# Patient Record
Sex: FEMALE | Race: WHITE
[De-identification: names, ages, dates, MRNs, and addresses within clinical notes are randomized per-mention and may not be internally consistent; named-entity substitution may affect disease eponyms.]

---

## 2018-04-05 ENCOUNTER — HOSPITAL ENCOUNTER (EMERGENCY)
Dept: HOSPITAL 17 - NEPE | Age: 68
LOS: 1 days | Discharge: HOME | End: 2018-04-06
Payer: OTHER GOVERNMENT

## 2018-04-05 VITALS — HEIGHT: 67 IN | WEIGHT: 198.42 LBS | BODY MASS INDEX: 31.14 KG/M2

## 2018-04-05 VITALS
SYSTOLIC BLOOD PRESSURE: 130 MMHG | RESPIRATION RATE: 16 BRPM | DIASTOLIC BLOOD PRESSURE: 75 MMHG | TEMPERATURE: 98.6 F | HEART RATE: 75 BPM | OXYGEN SATURATION: 100 %

## 2018-04-05 DIAGNOSIS — M19.90: ICD-10-CM

## 2018-04-05 DIAGNOSIS — S01.81XA: Primary | ICD-10-CM
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
## 2018-04-05 DIAGNOSIS — E07.9: ICD-10-CM

## 2018-04-05 DIAGNOSIS — I10: ICD-10-CM

## 2018-04-05 DIAGNOSIS — E78.00: ICD-10-CM

## 2018-04-05 DIAGNOSIS — F32.9: ICD-10-CM

## 2018-04-05 DIAGNOSIS — K21.9: ICD-10-CM

## 2018-04-05 LAB
ALBUMIN SERPL-MCNC: 2.7 GM/DL (ref 3.4–5)
ALP SERPL-CCNC: 44 U/L (ref 45–117)
ALT SERPL-CCNC: 29 U/L (ref 10–53)
AST SERPL-CCNC: 28 U/L (ref 15–37)
BASOPHILS # BLD AUTO: 0.1 TH/MM3 (ref 0–0.2)
BASOPHILS NFR BLD: 0.9 % (ref 0–2)
BILIRUB SERPL-MCNC: 0.2 MG/DL (ref 0.2–1)
BUN SERPL-MCNC: 18 MG/DL (ref 7–18)
CALCIUM SERPL-MCNC: 8.4 MG/DL (ref 8.5–10.1)
CHLORIDE SERPL-SCNC: 111 MEQ/L (ref 98–107)
CREAT SERPL-MCNC: 1.04 MG/DL (ref 0.5–1)
EOSINOPHIL # BLD: 0.3 TH/MM3 (ref 0–0.4)
EOSINOPHIL NFR BLD: 4.6 % (ref 0–4)
ERYTHROCYTE [DISTWIDTH] IN BLOOD BY AUTOMATED COUNT: 14.8 % (ref 11.6–17.2)
GFR SERPLBLD BASED ON 1.73 SQ M-ARVRAT: 53 ML/MIN (ref 89–?)
GLUCOSE SERPL-MCNC: 102 MG/DL (ref 74–106)
HCO3 BLD-SCNC: 24.6 MEQ/L (ref 21–32)
HCT VFR BLD CALC: 40.5 % (ref 35–46)
HGB BLD-MCNC: 12.9 GM/DL (ref 11.6–15.3)
LYMPHOCYTES # BLD AUTO: 1.8 TH/MM3 (ref 1–4.8)
LYMPHOCYTES NFR BLD AUTO: 26.4 % (ref 9–44)
MCH RBC QN AUTO: 30.3 PG (ref 27–34)
MCHC RBC AUTO-ENTMCNC: 31.9 % (ref 32–36)
MCV RBC AUTO: 95.2 FL (ref 80–100)
MONOCYTE #: 0.5 TH/MM3 (ref 0–0.9)
MONOCYTES NFR BLD: 7.3 % (ref 0–8)
NEUTROPHILS # BLD AUTO: 4.2 TH/MM3 (ref 1.8–7.7)
NEUTROPHILS NFR BLD AUTO: 60.8 % (ref 16–70)
PLATELET # BLD: 299 TH/MM3 (ref 150–450)
PMV BLD AUTO: 8.2 FL (ref 7–11)
PROT SERPL-MCNC: 6.1 GM/DL (ref 6.4–8.2)
RBC # BLD AUTO: 4.25 MIL/MM3 (ref 4–5.3)
SODIUM SERPL-SCNC: 143 MEQ/L (ref 136–145)
WBC # BLD AUTO: 7 TH/MM3 (ref 4–11)

## 2018-04-05 PROCEDURE — 96374 THER/PROPH/DIAG INJ IV PUSH: CPT

## 2018-04-05 PROCEDURE — 70486 CT MAXILLOFACIAL W/O DYE: CPT

## 2018-04-05 PROCEDURE — 96361 HYDRATE IV INFUSION ADD-ON: CPT

## 2018-04-05 PROCEDURE — 73080 X-RAY EXAM OF ELBOW: CPT

## 2018-04-05 PROCEDURE — 99285 EMERGENCY DEPT VISIT HI MDM: CPT

## 2018-04-05 PROCEDURE — 72125 CT NECK SPINE W/O DYE: CPT

## 2018-04-05 PROCEDURE — 73030 X-RAY EXAM OF SHOULDER: CPT

## 2018-04-05 PROCEDURE — 12055 INTMD RPR FACE/MM 12.6-20 CM: CPT

## 2018-04-05 PROCEDURE — 70450 CT HEAD/BRAIN W/O DYE: CPT

## 2018-04-05 PROCEDURE — 85025 COMPLETE CBC W/AUTO DIFF WBC: CPT

## 2018-04-05 PROCEDURE — 80053 COMPREHEN METABOLIC PANEL: CPT

## 2018-04-05 NOTE — PD
Physical Exam


Date Seen by Provider:  Apr 5, 2018


Time Seen by Provider:  23:48


Narrative


67-year-old here for evaluation to repair laceration. Please refer to his note.





Data


Data


Last Documented VS





Vital Signs








  Date Time  Temp Pulse Resp B/P (MAP) Pulse Ox O2 Delivery O2 Flow Rate FiO2


 


4/5/18 22:33   16  100 Room Air  


 


4/5/18 22:29 98.6 75  130/75 (93)    








Orders





 Orders


Complete Blood Count With Diff (4/5/18 22:38)


Comprehensive Metabolic Panel (4/5/18 22:38)


Sodium Chlorid 0.9% 500 Ml Inj (Ns 500 M (4/5/18 22:45)


Ct Brain W/O Iv Contrast(Rout) (4/5/18 )


Ct Facial Bones W/O Iv Cont (4/5/18 )


Ct Cerv Spine W/O Contrast (4/5/18 )





Labs





Laboratory Tests








Test


  4/5/18


22:50


 


Blood Urea Nitrogen 18 MG/DL 


 


Creatinine 1.04 MG/DL 


 


Random Glucose 102 MG/DL 


 


Total Protein 6.1 GM/DL 


 


Albumin 2.7 GM/DL 


 


Calcium Level 8.4 MG/DL 


 


Alkaline Phosphatase 44 U/L 


 


Aspartate Amino Transf


(AST/SGOT) 28 U/L 


 


 


Alanine Aminotransferase


(ALT/SGPT) 29 U/L 


 


 


Total Bilirubin 0.2 MG/DL 


 


Sodium Level 143 MEQ/L 


 


Potassium Level 4.2 MEQ/L 


 


Chloride Level 111 MEQ/L 


 


Carbon Dioxide Level 24.6 MEQ/L 


 


Anion Gap 7 MEQ/L 


 


Estimat Glomerular Filtration


Rate 53 ML/MIN 


 











MDM


Medical Record Reviewed:  Yes


Supervised Visit with LITA:  No


Procedures


**Procedure Narrative**


LACERATION


LOCATION: face


LENGTH: 15 cm


NUMBER OF STITCHES/STAPLES: 36 sutures, 7 sutures (deep)





REPAIR: The area of the laceration was prepped with Betadine and sterilely 

draped.  The laceration was infiltrated with 1% Xylocaine.  The wound was 

copiously irrigated and explored without evidence of foreign body, tendon 

injury or neurovascular injury.  The wound was closed using 4-0 Prolene and 4-0 

Vycril. This was a 2 layer repair. A sterile dressing was applied. The patient 

was advised to keep the dressing clean and dry. Patient tolerated the procedure 

well.











Migue Muhammad Apr 5, 2018 23:51

## 2018-04-06 NOTE — RADRPT
EXAM DATE/TIME:  04/05/2018 23:56 

 

[facility] COMPARISON:     

No previous studies available for comparison.

 

 

INDICATIONS :     

Trauma, fall. Laceration to right forehead.

                      

 

RADIATION DOSE:     

21.43 CTDIvol (mGy) 

 

 

 

MEDICAL HISTORY :     

Hypertension. Osteoporosis. 

 

SURGICAL HISTORY :      

Fusion, cervical. 

 

ENCOUNTER:      

Initial

 

ACUITY:      

1 day

 

PAIN SCALE:      

5/10

 

LOCATION:        

neck 

 

TECHNIQUE:     

Volumetric scanning of the cervical spine was performed. Multiplanar reconstructions in the sagittal,
 coronal and oblique axial planes were performed.   Using automated exposure control and adjustment o
f the mA and/or kV according to patient size, radiation dose was kept as low as reasonably achievable
 to obtain optimal diagnostic quality images.   DICOM format image data is available electronically f
or review and comparison.  

 

FINDINGS:     

 

VERTEBRAE:     

Marked cervical kyphosis centered at C4-5. 6 mm anterolisthesis C3 on C4, likely chronic. There is ev
idence of bone bridging between the C3 and C4 vertebral bodies. Anterior fusion hardware is seen at C
3-4. Hardware is intact. No evidence of acute fracture. Superior migration of the C2 odontoid process
 relative to the skull base along with bony hypertrophy at C1-2 anteriorly. Mild to moderate central 
canal narrowing at the craniocervical junction.

 

C2-C3: 

Moderate left-sided facet arthrosis. Moderate left neural foraminal narrowing. Central canal diameter
 within normal limits.

 

C3-C4: 

Bony fusion at this level. Anterior fusion hardware. Severe left-sided facet arthrosis. Severe left n
eural foraminal narrowing. Mild central canal narrowing.

 

C4-C5: 

Broad-based disc osteophyte complex. No evidence of focal disc protrusion. Central canal normal diame
ter. Neural foraminal diameters within normal limits.

 

C5-C6: 

Broad-based disc osteophyte complex. Mild central canal narrowing. Neural foraminal diameter is withi
n normal limits.

 

C6-C7: 

Broad-based disc osteophyte complex. Mild/moderate central canal narrowing. Moderate left neural fora
nicole narrowing.

 

C7-T1: 

Bilateral facet arthrosis. Mild left neural foraminal narrowing. Central canal diameter within normal
 limits.

 

CONCLUSION:     

1. No evidence of acute fracture.

2. Postsurgical findings at C3-4 with anterior fusion hardware and bony fusion. Mild central canal na
rrowing at this level.

3. Relative superior migration of the C2 odontoid process along with anterior C1-2 arthrosis. Mild/mo
derate central canal narrowing at the craniocervical junction. Likely chronic findings.

4. Prominent cervical kyphosis centered at C4-5.

5. Multilevel degenerative findings in the cervical spine with broad-based disc osteophyte complex at
 C6-7 resulting in mild/moderate central canal narrowing.

 

 

 

 Irwin Ibrahim MD on April 06, 2018 at 0:55           

Board Certified Radiologist.

 This report was verified electronically.

## 2018-04-06 NOTE — RADRPT
EXAM DATE/TIME:  04/05/2018 23:56 

 

[facility] COMPARISON:     

No previous studies available for comparison.

 

 

INDICATIONS :     

Trauma, fall. Laceration to right forehead.

                      

 

RADIATION DOSE:     

56.35 CTDIvol (mGy) 

 

 

 

MEDICAL HISTORY :     

Hypertension. Osteoporosis. 

 

SURGICAL HISTORY :      

Fusion, cervical. 

 

ENCOUNTER:      

Initial

 

ACUITY:      

1 day

 

PAIN SCALE:      

6/10

 

LOCATION:        

cranial 

 

TECHNIQUE:     

Multiple contiguous axial images were obtained of the head.  Using automated exposure control and adj
ustment of the mA and/or kV according to patient size, radiation dose was kept as low as reasonably a
chievable to obtain optimal diagnostic quality images.   DICOM format image data is available electro
nically for review and comparison.  

 

FINDINGS:     

 

CEREBRUM:     

The ventricles are normal for age.  No evidence of midline shift, mass lesion, hemorrhage or acute in
farction.  No extra-axial fluid collections are seen. Old left basal ganglia lacunar infarcts.

 

POSTERIOR FOSSA:     

The cerebellum and brainstem are intact.  The 4th ventricle is midline.  The cerebellopontine angle i
s unremarkable.

 

EXTRACRANIAL:     

The visualized portion of the orbits is intact. Evidence of right frontal scalp laceration.

 

SKULL:     

The calvaria is intact.  No evidence of skull fracture.

 

CONCLUSION:     No acute intracranial findings. 

 

 

 Irwin Ibrahim MD on April 06, 2018 at 0:53           

Board Certified Radiologist.

 This report was verified electronically.

## 2018-04-06 NOTE — RADRPT
EXAM DATE/TIME:  04/06/2018 01:22 

 

[facility] COMPARISON:     

SHOULDER LEFT COMPLETE (>2VWS), April 06, 2018, 0:11.

 

                     

INDICATIONS :     

Pain post fall.

                     

 

MEDICAL HISTORY :     

None.          

 

SURGICAL HISTORY :     

None.   

 

ENCOUNTER:     

Initial                                        

 

ACUITY:     

1 day      

 

PAIN SCORE:     

5/10

 

LOCATION:     

Left  Elbow.

 

FINDINGS:     

4 views left elbow. Bone alignment within normal limits.  No evidence of fracture. No evidence of maximino
nt effusion. Corticated ossicle of the lateral epicondyle likely are presenting sequela of remote tra
semaj or tendinosis.

 

CONCLUSION:     

No evidence of acute fracture.

 

 

 

 

 Irwin Ibrahim MD on April 06, 2018 at 1:37           

Board Certified Radiologist.

 This report was verified electronically.

## 2018-04-06 NOTE — RADRPT
EXAM DATE/TIME:  04/06/2018 00:11 

 

[facility] COMPARISON:     

No previous studies available for comparison.

 

                     

INDICATIONS :     

Patient complains of left shoudler pain status post fall. 

                     

 

MEDICAL HISTORY :     

None.          

 

SURGICAL HISTORY :     

None.   

 

ENCOUNTER:     

Initial                                        

 

ACUITY:     

1 day      

 

PAIN SCORE:     

6/10

 

LOCATION:     

Left  Shoulder

 

FINDINGS:     

4 views left shoulder. Bone alignment within normal limits.  No evidence of fracture. Glenohumeral obdulia
int within normal limits. Acromioclavicular joint within normal limits.

 

CONCLUSION:     No evidence of fracture.

 

 

 

 Irwin Ibrahim MD on April 06, 2018 at 1:04           

Board Certified Radiologist.

 This report was verified electronically.

## 2018-04-06 NOTE — PD
HPI


Chief Complaint:  Fall


Time Seen by Provider:  22:33


Travel History


International Travel<30 days:  No


Contact w/Intl Traveler<30days:  No


Traveled to known affect area:  No





History of Present Illness


HPI


Patient is a 67-year-old female who fell out of her bed getting up to go to the 

bathroom and smashed her face into nightstand has a 20 cm laceration from her 

her scalp line midline down into her mid right forehead all the way out to the 

right temple is linear in a flap-like picture.  She denies syncope she says she 

tripped patient had a lot of blood at the scene according the paramedics and 

originally was hypotensive was given 500 cc bolus arrives normotensive at 120 

systolic and patient main complaint is her left elbow and left shoulder and 

forehead pain she is in c-collar and longboard we logrolled her off the 

backboard she has no spinous process tenderness and she is CAT scan head neck 

face and suture repair will be done by the PA





[facility]


Past Medical History


Arthritis:  Yes


Asthma:  No


Autoimmune Disease:  No


Anxiety:  No


Depression:  Yes


Heart Rhythm Problems:  No


Cancer:  No


Cardiovascular Problems:  Yes


High Cholesterol:  Yes


Chemotherapy:  No


Chest Pain:  No


Congestive Heart Failure:  No


COPD:  No


Cerebrovascular Accident:  No


Diabetes:  No


Diminished Hearing:  No


Endocrine:  Yes


GERD:  Yes


Genitourinary:  No


Headaches:  Yes


Hiatal Hernia:  No


Hypertension:  Yes


Immune Disorder:  No


Musculoskeletal:  Yes


Neurologic:  Yes


Psychiatric:  Yes


Reproductive:  No


Respiratory:  Yes


Migraines:  No


Radiation Therapy:  No


Seizures:  No


Sleep Apnea:  Yes (USES CPAP MACHINE)


Thyroid Disease:  Yes


Ulcer:  No


Tetanus Vaccination:  < 5 Years


Influenza Vaccination:  No





Past Surgical History


Abdominal Surgery:  Yes (COLOSTOMY, COLOSTOMY REVERSAL)


Body Medical Devices:  PARTIAL THYROID WITH STAPLES, NECK WITH SCREWS


Joint Replacement:  Yes (BILATERAL HIPS)


Oral Surgery:  Yes (T&A, WISDOM TEETH)


Pacemaker:  No


Thoracic Surgery:  Yes (PARTIAL THYROIDECTOMY)





Social History


Alcohol Use:  No


Tobacco Use:  No


Substance Use:  No





Allergies-Medications


(Allergen,Severity, Reaction):  


Coded Allergies:  


     Sulfa (Sulfonamide Antibiotics) (Unverified  Allergy, Severe, ITCH, 4/5/18)


     morphine (Unverified  Allergy, Severe, VOMITING, 4/5/18)


Reported Meds & Prescriptions





Reported Meds & Active Scripts


Active


Keflex (Cephalexin) 250 Mg Cap 250 Mg PO Q8HR


Bacitracin Topical 500 Unit/Gm Oint 1 Applic TOPICAL BID


Ibuprofen 600 Mg Tab 600 Mg PO Q6H PRN


Norco (Hydrocodone-Acetaminophen) 5 Mg-325 Mg Tab 1 Tab PO Q4H PRN


Reported


Digestive Advantage Probiotic (Lactobacillus Rhamnosus (GG)) 1 Chew 1 Tab CHEW 


Fiber Gummies (Inulin) 2 Gram Tab.chew   


Vitamin D3 (Cholecalciferol) 2,000 Unit Cap 2,000 Units PO DAILY


Magnesium Oxide 250 Mg Tab 250 Mg PO AS DIRECTED


Calcium 600 with Vitamin D (Calcium Carbonate-Cholecalciferol) 600-400 mg-Unit 

Tab 1 Tab PO DAILY


Spectravite Senior (Multivit with Iron-Minerals) 1 Each Tablet   


Tizanidine (Tizanidine HCl) 4 Mg Cap 4 Mg PO TID


Aripiprazole 10 Mg Tab 10 Mg PO DAILY


Clonazepam 0.5 Mg Tab 0.5 Mg PO BID


Duloxetine DR (Duloxetine HCl) 60 Mg Capdr 60 Mg PO DAILY


Clonidine (Clonidine HCl) 0.1 Mg Tab 0.1 Mg PO BID


Risedronate 150 Mg Tab 150 Mg PO Q30D


Amlodipine (Amlodipine Besylate) 5 Mg Tab 5 Mg PO DAILY


Metoprolol Tartrate 25 Mg Tab 25 Mg PO BID


Losartan (Losartan Potassium) 50 Mg Tab 50 Mg PO DAILY


Rosuvastatin (Rosuvastatin Calcium) 10 Mg Tab 10 Mg PO HS


Modafinil 200 Mg Tab 200 Mg PO DAILY


Pantoprazole (Pantoprazole Sodium) 40 Mg Tab 40 Mg PO DAILY


Meloxicam 15 Mg Tab 15 Mg PO DAILY


Levothyroxine (Levothyroxine Sodium) 50 Mcg Tab 50 Mcg PO DAILY








Review of Systems


Except as stated in HPI:  all other systems reviewed are Neg





Physical Exam


Narrative


GENERAL: Obvious large linear laceration 20 cm to the forehead from the scalp 

line to the right temple.  Bleeding controlled with a pressure dressing


SKIN: Warm and dry.  Large 20 cm linear lack through the forehead to the right 

temple


HEAD: Atraumatic. Normocephalic. 


EYES: Pupils equal and round. No scleral icterus. No injection or drainage. 


ENT: No nasal bleeding or discharge.  Mucous membranes pink and moist.


NECK: Trachea midline. No JVD.  Patient is in c-collar


CARDIOVASCULAR: Regular rate and rhythm.  


RESPIRATORY: No accessory muscle use. Clear to auscultation. Breath sounds 

equal bilaterally. 


GASTROINTESTINAL: Abdomen soft, non-tender, nondistended. Hepatic and splenic 

margins not palpable. 


MUSCULOSKELETAL: Extremities patient has tenderness and swelling to the left 

elbow and the left shoulder  


NEUROLOGICAL: Awake and alert. No obvious cranial nerve deficits.  Motor 

grossly within normal limits. Five out of 5 muscle strength in the arms and 

legs.  Normal speech.


PSYCHIATRIC: Appropriate mood and affect; insight and judgment normal.





Data


Data


Last Documented VS





Vital Signs








  Date Time  Temp Pulse Resp B/P (MAP) Pulse Ox O2 Delivery O2 Flow Rate FiO2


 


4/5/18 22:33   16  100 Room Air  


 


4/5/18 22:29 98.6 75  130/75 (93)    








Orders





 Orders


Complete Blood Count With Diff (4/5/18 22:38)


Comprehensive Metabolic Panel (4/5/18 22:38)


Sodium Chlorid 0.9% 500 Ml Inj (Ns 500 M (4/5/18 22:45)


Ct Brain W/O Iv Contrast(Rout) (4/5/18 )


Ct Facial Bones W/O Iv Cont (4/5/18 )


Ct Cerv Spine W/O Contrast (4/5/18 )


Shoulder, Complete (>2vws) (4/5/18 )


Elbow, Complete (4 Vws) (4/6/18 )


Morphine Inj (Morphine Inj) (4/6/18 01:15)


Ed Discharge Order (4/6/18 02:20)





Labs





Laboratory Tests








Test


  4/5/18


22:50 4/5/18


23:45


 


Blood Urea Nitrogen 18 MG/DL  


 


Creatinine 1.04 MG/DL  


 


Random Glucose 102 MG/DL  


 


Total Protein 6.1 GM/DL  


 


Albumin 2.7 GM/DL  


 


Calcium Level 8.4 MG/DL  


 


Alkaline Phosphatase 44 U/L  


 


Aspartate Amino Transf


(AST/SGOT) 28 U/L 


  


 


 


Alanine Aminotransferase


(ALT/SGPT) 29 U/L 


  


 


 


Total Bilirubin 0.2 MG/DL  


 


Sodium Level 143 MEQ/L  


 


Potassium Level 4.2 MEQ/L  


 


Chloride Level 111 MEQ/L  


 


Carbon Dioxide Level 24.6 MEQ/L  


 


Anion Gap 7 MEQ/L  


 


Estimat Glomerular Filtration


Rate 53 ML/MIN 


  


 


 


White Blood Count  7.0 TH/MM3 


 


Red Blood Count  4.25 MIL/MM3 


 


Hemoglobin  12.9 GM/DL 


 


Hematocrit  40.5 % 


 


Mean Corpuscular Volume  95.2 FL 


 


Mean Corpuscular Hemoglobin  30.3 PG 


 


Mean Corpuscular Hemoglobin


Concent 


  31.9 % 


 


 


Red Cell Distribution Width  14.8 % 


 


Platelet Count  299 TH/MM3 


 


Mean Platelet Volume  8.2 FL 


 


Neutrophils (%) (Auto)  60.8 % 


 


Lymphocytes (%) (Auto)  26.4 % 


 


Monocytes (%) (Auto)  7.3 % 


 


Eosinophils (%) (Auto)  4.6 % 


 


Basophils (%) (Auto)  0.9 % 


 


Neutrophils # (Auto)  4.2 TH/MM3 


 


Lymphocytes # (Auto)  1.8 TH/MM3 


 


Monocytes # (Auto)  0.5 TH/MM3 


 


Eosinophils # (Auto)  0.3 TH/MM3 


 


Basophils # (Auto)  0.1 TH/MM3 


 


CBC Comment  DIFF FINAL 


 


Differential Comment   











MDM


Medical Decision Making


Medical Screen Exam Complete:  Yes


Emergency Medical Condition:  Yes


Differential Diagnosis


scalp  lac  vs  fracture   skull vs  fractured   elbow  shoulder left  possible

  dislocation ,     right  rib  pain .


Narrative Course


pt  has  LArge  flap laceration  requiring  double layer  repair  done by the  

CARLO Bettencourt  with  very  good cosmetic  repair  and  good detail done to 

repair to minimize scarring  over 1 hr of his  time  spent  repairing ,  Pt  

has  labs  and  CT  head and  cervical  spine and  facial  bones  to  rule out  

fracture or  intracranila  bleed or  contusion,  CT  are  negative  and   I  

clean  her  hair  of  dried  blood and   place  bacitracin  over  her repaired 

laceration .    Pt  ambulates  without  issue and  feels  comfortable to go 

home  with   7 days  return  for  wound  eval and  suture removal,  I  inform 

the need to  keep  scar out of   direct  sunlight  for the next  3 months   and 

to use  sun block after  sutures out  and  hat .  pt  and  are planning 

to drive to [facility]  I  advise then they need to see an MD  in  7days  

for wound eval





Diagnosis





 Primary Impression:  


 Head trauma


 Qualified Codes:  S09.90XA - Unspecified injury of head, initial encounter


 Additional Impressions:  


 Laceration of face, complex


 Qualified Codes:  S01.91XA - Laceration without foreign body of unspecified 

part of head, initial encounter


 Laceration of face, complicated


 Qualified Codes:  S01.81XA - Laceration without foreign body of other part of 

head, initial encounter


Patient Instructions:  Facial Laceration (ED), General Instructions





***Additional Instructions:  


Keep  facial sutures  dry  for  next  2 days  then  you  can  shower , but  do 

not  submerge  your  head until  7 days  when  sutures  come  out.  Sutures to 

be removed  in 7 days ,  Keep  face  out of direct sunlight over next 2 months 

to minimize scarring.


Scripts


Cephalexin (Keflex) 250 Mg Cap


250 MG PO Q8HR for Infection, #15 CAP 0 Refills


   Prov: Raheel Villasenor MD         4/6/18 


Bacitracin Topical (Bacitracin Topical) 500 Unit/Gm Oint


1 APPLIC TOPICAL BID for Infection, #30 GM 0 Refills


   Prov: Raheel Villasenor MD         4/6/18 


Ibuprofen (Ibuprofen) 600 Mg Tab


600 MG PO Q6H Y for Pain/Inflammation, #20 TAB 0 Refills


   Prov: Raheel Villasenor MD         4/6/18 


Hydrocodone-Acetaminophen (Norco) 5 Mg-325 Mg Tab


1 TAB PO Q4H Y for PAIN, #10 TAB 0 Refills


   Prov: Raheel Villasenor MD         4/6/18


Disposition:  01 DISCHARGE HOME


Condition:  Good











Raheel Villasenor MD Apr 6, 2018 00:40

## 2018-04-06 NOTE — RADRPT
EXAM DATE/TIME:  04/05/2018 23:56 

 

[facility] COMPARISON:     

No previous studies available for comparison.

 

 

INDICATIONS :     

Trauma, fall. Laceration to right forehead.

                      

 

RADIATION DOSE:     

21.96 CTDIvol (mGy) 

 

 

MEDICAL HISTORY :     

Osteoporosis. Hypertension. 

 

SURGICAL HISTORY :      

Fusion, cervical. 

 

ENCOUNTER:      

Initial

 

ACUITY:      

1 day

 

PAIN SCORE:      

7/10

 

LOCATION:        

facial 

 

TECHNIQUE:     

Volumetric scanning of the facial bones was performed.  Using automated exposure control and adjustme
nt of the mA and/or kV according to patient size, radiation dose was kept as low as reasonably achiev
able to obtain optimal diagnostic quality images.  DICOM format image data is available electronicall
y for review and comparison.  

 

FINDINGS:     

Gas in the superficial soft tissues of the right frontal region. No evidence of fracture.

 

Orbits are intact. Globes are symmetric. Paranasal sinuses are clear.

 

CONCLUSION:     

No evidence of fracture.

 

 

 

 Irwin Ibrahim MD on April 06, 2018 at 1:02           

Board Certified Radiologist.

 This report was verified electronically.